# Patient Record
Sex: MALE | Race: WHITE | NOT HISPANIC OR LATINO | URBAN - METROPOLITAN AREA
[De-identification: names, ages, dates, MRNs, and addresses within clinical notes are randomized per-mention and may not be internally consistent; named-entity substitution may affect disease eponyms.]

---

## 2021-01-31 ENCOUNTER — EMERGENCY (EMERGENCY)
Facility: HOSPITAL | Age: 26
LOS: 1 days | Discharge: HOME | End: 2021-01-31
Attending: EMERGENCY MEDICINE | Admitting: EMERGENCY MEDICINE
Payer: COMMERCIAL

## 2021-01-31 VITALS
HEART RATE: 94 BPM | RESPIRATION RATE: 20 BRPM | DIASTOLIC BLOOD PRESSURE: 71 MMHG | SYSTOLIC BLOOD PRESSURE: 133 MMHG | HEIGHT: 66 IN | TEMPERATURE: 98 F | WEIGHT: 153 LBS | OXYGEN SATURATION: 96 %

## 2021-01-31 VITALS
RESPIRATION RATE: 18 BRPM | OXYGEN SATURATION: 98 % | DIASTOLIC BLOOD PRESSURE: 72 MMHG | TEMPERATURE: 98 F | HEART RATE: 80 BPM | SYSTOLIC BLOOD PRESSURE: 130 MMHG

## 2021-01-31 LAB
APPEARANCE UR: CLEAR — SIGNIFICANT CHANGE UP
BILIRUB UR-MCNC: NEGATIVE — SIGNIFICANT CHANGE UP
COLOR SPEC: YELLOW — SIGNIFICANT CHANGE UP
DIFF PNL FLD: NEGATIVE — SIGNIFICANT CHANGE UP
GLUCOSE UR QL: NEGATIVE MG/DL — SIGNIFICANT CHANGE UP
KETONES UR-MCNC: NEGATIVE — SIGNIFICANT CHANGE UP
LEUKOCYTE ESTERASE UR-ACNC: NEGATIVE — SIGNIFICANT CHANGE UP
NITRITE UR-MCNC: NEGATIVE — SIGNIFICANT CHANGE UP
PH UR: 5 — SIGNIFICANT CHANGE UP (ref 5–8)
PROT UR-MCNC: NEGATIVE MG/DL — SIGNIFICANT CHANGE UP
SP GR SPEC: 1.01 — SIGNIFICANT CHANGE UP (ref 1.01–1.02)
UROBILINOGEN FLD QL: NEGATIVE MG/DL — SIGNIFICANT CHANGE UP

## 2021-01-31 PROCEDURE — 99284 EMERGENCY DEPT VISIT MOD MDM: CPT

## 2021-01-31 PROCEDURE — 99284 EMERGENCY DEPT VISIT MOD MDM: CPT | Mod: 25

## 2021-01-31 PROCEDURE — 76870 US EXAM SCROTUM: CPT | Mod: 26

## 2021-01-31 PROCEDURE — 76870 US EXAM SCROTUM: CPT

## 2021-01-31 PROCEDURE — 81003 URINALYSIS AUTO W/O SCOPE: CPT

## 2021-01-31 NOTE — ED PROVIDER NOTE - CONSTITUTIONAL, MLM
normal... Well appearing, awake, alert, oriented to person, place, time/situation and in no apparent distress. Anxious, with mild psychomotor agitation

## 2021-01-31 NOTE — ED PROVIDER NOTE - CLINICAL SUMMARY MEDICAL DECISION MAKING FREE TEXT BOX
Patient with unusual feeling in left testicle, and history of same multiple times in the past. Will get sonogram to r/o torsion or other pathology. If not torsed, will refer to urology

## 2021-01-31 NOTE — ED ADULT NURSE NOTE - CAS DISCH BELONGINGS RETURNED
Kilograms Preamble Statement (Weight Entered In Details Tab): Reported Weight in kilograms: Not applicable

## 2021-01-31 NOTE — ED PROVIDER NOTE - OBJECTIVE STATEMENT
Patient c/o unusual feeling in left testicle for a few hours. States that he feels as if his left testicle is "going a different way". States he does not have real pain, but it feels weird. States that this has happened to him over 10 times in the past 2-3 years, but usually would only last 15 minutes and resolve. Tonight, it's not resolving. No fever. Has not noticed any redness or swelling. No n/v. No urinary c/o or penile d/c. Patient states he saw a doctor once in the past for testicular pain. Was referred to a urologist but never went as the pain went away.

## 2021-01-31 NOTE — ED ADULT NURSE NOTE - OBJECTIVE STATEMENT
pt reports his "scrotum is going the wrong way". reports this has happened before; denies pain presently. reports "it's lasting more than 15 minutes"

## 2021-01-31 NOTE — ED PROVIDER NOTE - GENITOURINARY TESTICULAR EXAM, LEFT
left testicle retracted. No swelling. Patient states palpation "feels wrong", but no pain. No palpable masses/CREMASTERIC REFLEXT ABSENT

## 2021-01-31 NOTE — ED PROVIDER NOTE - PATIENT PORTAL LINK FT
You can access the FollowMyHealth Patient Portal offered by [facility] by registering at the following website: [email or web address]. By joining [employer]’s FollowMyHealth portal, you will also be able to view your health information using other applications (apps) compatible with our system.

## 2021-01-31 NOTE — ED PROVIDER NOTE - INTERNATIONAL TRAVEL
Gallstone pancreatitis  01/31/2019  previous attack of gallstone pancreatitis  Active  Catarino Velásquez
No

## 2021-01-31 NOTE — ED PROVIDER NOTE - NSFOLLOWUPINSTRUCTIONS_ED_ALL_ED_FT
Scrotal Pain    WHAT YOU NEED TO KNOW:    What do I need to know about scrotal pain? Scrotal pain can happen at any age. The cause of scrotal pain can range from a minor injury to a serious medical condition. It is very important to seek immediate care if you have scrotal pain. The pain may be a warning sign of a serious condition that will need treatment. Without immediate care, you may be at increased risk for losing a testicle or being sterile (not having children).    What may cause scrotal pain?   •Torsion (twisting) of the testicle, cord that carries sperm from the testicle, or tissue attached to the testicle      •An infection of the testicle or other area in the scrotum      •A hydrocele (fluid buildup around the testicle) or varicocele (blood backup in veins in the scrotum)      •An inguinal hernia (tissue pushed out of place in your groin)      •Zoran gangrene (tissue death of the area between the scrotum and anus)      •A urinary tract infection or stone that is passing, or an infected appendix      •An injury in your groin or scrotum      What are the warning signs of a serious medical problem? Seek care immediately if you have any of the following:   •Pain that starts suddenly or is severe      •Swelling in your scrotum or groin, especially if you also have severe pain or are vomiting      •Red or black patches of skin on your scrotum or area between your penis and anus      •Blisters anywhere in your groin or scrotum      •A fever      How is the cause of scrotal pain diagnosed? Your healthcare provider will examine you and ask about your pain. Tell the provider when the pain started and how long it lasts. Your provider will ask if pain started in another area and moved to your scrotum. The pain may also have moved from your scrotum to another area. Tell your provider if you have pain during exercise or if you had an injury to your groin. Also tell your provider if you have any problems urinating or if any discharge came out of your penis. Your provider may also ask about your sexual activity.  •Blood tests may be used to check for signs of infection.      •Ultrasound pictures may show a problem with your testicles or tissues in your scrotum. An ultrasound may also show kidney stones or other problems that may be causing your pain.      How is scrotal pain treated? Treatment will depend on the cause of your pain:  •Prescription pain medicine may be given. Ask your healthcare provider how to take this medicine safely. Some prescription pain medicines contain acetaminophen. Do not take other medicines that contain acetaminophen without talking to your healthcare provider. Too much acetaminophen may cause liver damage. Prescription pain medicine may cause constipation. Ask your healthcare provider how to prevent or treat constipation.       •NSAIDs, such as ibuprofen, help decrease swelling, pain, and fever. This medicine is available with or without a doctor's order. NSAIDs can cause stomach bleeding or kidney problems in certain people. If you take blood thinner medicine, always ask your healthcare provider if NSAIDs are safe for you. Always read the medicine label and follow directions.      •Antibiotics are used to treat a bacterial infection.      •Surgery may be needed to untwist the testicle, or cord, or to remove dead or infected tissue.       What can I do to manage my symptoms?   •Wear a support device, if directed. A support device, such as a jock strap, can help keep your scrotum lifted and supported. This can help decrease pain.      •Apply ice to your scrotum. Ice helps decrease pain and swelling. Use an ice pack, or put crushed ice in a plastic bag. Cover the pack or bag with a towel before you apply it to your scrotum. Apply ice for 15 to 20 minutes every hour, or as directed.      When should I seek immediate care?   •You have any warning signs of a serious problem.      •You have pain or swelling that starts or gets worse quickly.      •You have skin changes in your scrotum, such as a dark patch.      •You have a fever.      When should I contact my healthcare provider?   •Your pain does not get better, even after you take pain medicine.      •You have new or worsening pain.      •You have questions or concerns about your condition or care.      CARE AGREEMENT:    You have the right to help plan your care. Learn about your health condition and how it may be treated. Discuss treatment options with your healthcare providers to decide what care you want to receive. You always have the right to refuse treatment.

## 2021-01-31 NOTE — ED ADULT NURSE REASSESSMENT NOTE - NS ED NURSE REASSESS COMMENT FT1
pt seen and examined by dr. romero. md aware of all results. u/s completed. pt d/c to self. left unit in NAD. ambulated unassisted

## 2024-03-27 ENCOUNTER — NON-APPOINTMENT (OUTPATIENT)
Age: 29
End: 2024-03-27

## 2024-03-29 PROBLEM — N50.82 SCROTAL PAIN: Chronic | Status: ACTIVE | Noted: 2021-01-31

## 2024-07-02 PROBLEM — Z00.00 ENCOUNTER FOR PREVENTIVE HEALTH EXAMINATION: Status: ACTIVE | Noted: 2024-07-02

## 2024-07-08 ENCOUNTER — APPOINTMENT (OUTPATIENT)
Dept: INTERNAL MEDICINE | Facility: CLINIC | Age: 29
End: 2024-07-08
Payer: COMMERCIAL

## 2024-07-08 ENCOUNTER — NON-APPOINTMENT (OUTPATIENT)
Age: 29
End: 2024-07-08

## 2024-07-08 VITALS
HEIGHT: 66 IN | BODY MASS INDEX: 25.07 KG/M2 | WEIGHT: 156 LBS | SYSTOLIC BLOOD PRESSURE: 113 MMHG | DIASTOLIC BLOOD PRESSURE: 72 MMHG | HEART RATE: 84 BPM | OXYGEN SATURATION: 96 %

## 2024-07-08 DIAGNOSIS — Z80.42 FAMILY HISTORY OF MALIGNANT NEOPLASM OF PROSTATE: ICD-10-CM

## 2024-07-08 DIAGNOSIS — Z00.00 ENCOUNTER FOR GENERAL ADULT MEDICAL EXAMINATION W/OUT ABNORMAL FINDINGS: ICD-10-CM

## 2024-07-08 DIAGNOSIS — Z78.9 OTHER SPECIFIED HEALTH STATUS: ICD-10-CM

## 2024-07-08 DIAGNOSIS — Z80.0 FAMILY HISTORY OF MALIGNANT NEOPLASM OF DIGESTIVE ORGANS: ICD-10-CM

## 2024-07-08 DIAGNOSIS — Z83.438 FAMILY HISTORY OF OTHER DISORDER OF LIPOPROTEIN METABOLISM AND OTHER LIPIDEMIA: ICD-10-CM

## 2024-07-08 PROCEDURE — 99385 PREV VISIT NEW AGE 18-39: CPT

## 2024-07-08 PROCEDURE — 93000 ELECTROCARDIOGRAM COMPLETE: CPT

## 2024-11-10 PROBLEM — R79.89 ELEVATED LFTS: Status: ACTIVE | Noted: 2024-11-10

## 2025-01-27 ENCOUNTER — APPOINTMENT (OUTPATIENT)
Dept: ULTRASOUND IMAGING | Facility: CLINIC | Age: 30
End: 2025-01-27
Payer: COMMERCIAL

## 2025-01-27 PROCEDURE — 76705 ECHO EXAM OF ABDOMEN: CPT

## 2025-02-07 ENCOUNTER — APPOINTMENT (OUTPATIENT)
Dept: INTERNAL MEDICINE | Facility: CLINIC | Age: 30
End: 2025-02-07
Payer: COMMERCIAL

## 2025-02-07 VITALS
SYSTOLIC BLOOD PRESSURE: 135 MMHG | OXYGEN SATURATION: 97 % | HEART RATE: 86 BPM | DIASTOLIC BLOOD PRESSURE: 81 MMHG | BODY MASS INDEX: 29.73 KG/M2 | HEIGHT: 66 IN | RESPIRATION RATE: 12 BRPM | WEIGHT: 185 LBS

## 2025-02-07 DIAGNOSIS — R79.89 OTHER SPECIFIED ABNORMAL FINDINGS OF BLOOD CHEMISTRY: ICD-10-CM

## 2025-02-07 PROCEDURE — 99214 OFFICE O/P EST MOD 30 MIN: CPT

## 2025-02-07 PROCEDURE — G2211 COMPLEX E/M VISIT ADD ON: CPT | Mod: NC

## 2025-02-07 PROCEDURE — 36415 COLL VENOUS BLD VENIPUNCTURE: CPT

## 2025-02-13 PROBLEM — R79.89 ELEVATED FERRITIN: Status: ACTIVE | Noted: 2025-02-13

## 2025-02-13 LAB
ALBUMIN SERPL ELPH-MCNC: 4.8 G/DL
ALP BLD-CCNC: 71 U/L
ALT SERPL-CCNC: 54 U/L
ANA SER IF-ACNC: NEGATIVE
AST SERPL-CCNC: 43 U/L
BILIRUB DIRECT SERPL-MCNC: 0.2 MG/DL
BILIRUB INDIRECT SERPL-MCNC: 0.9 MG/DL
BILIRUB SERPL-MCNC: 1.1 MG/DL
FERRITIN SERPL-MCNC: 633 NG/ML
HAPTOGLOB SERPL-MCNC: 52 MG/DL
HAV IGM SER QL: NONREACTIVE
HBV CORE IGM SER QL: NONREACTIVE
HBV SURFACE AG SER QL: NONREACTIVE
HCV AB SER QL: NONREACTIVE
HCV S/CO RATIO: 0.1 S/CO
LDH SERPL-CCNC: 275 U/L
LKM AB SER QL IF: <20.1 UNITS
MITOCHONDRIA AB SER IF-ACNC: NORMAL
PROT SERPL-MCNC: 7 G/DL
SMOOTH MUSCLE AB SER QL IF: NORMAL

## 2025-02-14 LAB
IRON SATN MFR SERPL: 23 %
IRON SERPL-MCNC: 64 UG/DL
TIBC SERPL-MCNC: 278 UG/DL
UIBC SERPL-MCNC: 214 UG/DL